# Patient Record
Sex: MALE | Race: WHITE | HISPANIC OR LATINO | Employment: FULL TIME | URBAN - METROPOLITAN AREA
[De-identification: names, ages, dates, MRNs, and addresses within clinical notes are randomized per-mention and may not be internally consistent; named-entity substitution may affect disease eponyms.]

---

## 2017-09-03 ENCOUNTER — HOSPITAL ENCOUNTER (EMERGENCY)
Facility: HOSPITAL | Age: 52
Discharge: HOME | End: 2017-09-03
Attending: EMERGENCY MEDICINE | Admitting: EMERGENCY MEDICINE
Payer: COMMERCIAL

## 2017-09-03 VITALS
HEART RATE: 64 BPM | OXYGEN SATURATION: 97 % | DIASTOLIC BLOOD PRESSURE: 57 MMHG | RESPIRATION RATE: 18 BRPM | SYSTOLIC BLOOD PRESSURE: 106 MMHG | TEMPERATURE: 97.7 F | WEIGHT: 168 LBS

## 2017-09-03 DIAGNOSIS — R22.2 CHEST WALL MASS: Primary | ICD-10-CM

## 2017-09-03 DIAGNOSIS — L72.3 SEBACEOUS CYST: ICD-10-CM

## 2017-09-03 PROCEDURE — 99283 EMERGENCY DEPT VISIT LOW MDM: CPT

## 2017-09-12 ENCOUNTER — ALLSCRIPTS OFFICE VISIT (OUTPATIENT)
Dept: OTHER | Facility: OTHER | Age: 52
End: 2017-09-12

## 2017-09-12 PROCEDURE — 88304 TISSUE EXAM BY PATHOLOGIST: CPT | Performed by: SURGERY

## 2017-09-13 ENCOUNTER — LAB REQUISITION (OUTPATIENT)
Dept: LAB | Facility: HOSPITAL | Age: 52
End: 2017-09-13
Payer: COMMERCIAL

## 2017-09-13 DIAGNOSIS — L72.3 SEBACEOUS CYST: ICD-10-CM

## 2017-09-17 ENCOUNTER — HOSPITAL ENCOUNTER (EMERGENCY)
Facility: HOSPITAL | Age: 52
Discharge: HOME | End: 2017-09-17
Attending: EMERGENCY MEDICINE | Admitting: EMERGENCY MEDICINE
Payer: COMMERCIAL

## 2017-09-17 VITALS
DIASTOLIC BLOOD PRESSURE: 98 MMHG | RESPIRATION RATE: 18 BRPM | BODY MASS INDEX: 30.22 KG/M2 | TEMPERATURE: 97 F | HEART RATE: 84 BPM | WEIGHT: 177 LBS | OXYGEN SATURATION: 98 % | HEIGHT: 64 IN | SYSTOLIC BLOOD PRESSURE: 146 MMHG

## 2017-09-17 DIAGNOSIS — Z51.89 VISIT FOR WOUND CHECK: Primary | ICD-10-CM

## 2017-09-17 PROCEDURE — 96372 THER/PROPH/DIAG INJ SC/IM: CPT

## 2017-09-17 PROCEDURE — 99283 EMERGENCY DEPT VISIT LOW MDM: CPT

## 2017-09-17 RX ORDER — KETOROLAC TROMETHAMINE 30 MG/ML
15 INJECTION, SOLUTION INTRAMUSCULAR; INTRAVENOUS ONCE
Status: COMPLETED | OUTPATIENT
Start: 2017-09-17 | End: 2017-09-17

## 2017-09-17 RX ADMIN — KETOROLAC TROMETHAMINE 15 MG: 30 INJECTION, SOLUTION INTRAMUSCULAR at 13:55

## 2017-09-19 ENCOUNTER — GENERIC CONVERSION - ENCOUNTER (OUTPATIENT)
Dept: OTHER | Facility: OTHER | Age: 52
End: 2017-09-19

## 2017-09-26 ENCOUNTER — ALLSCRIPTS OFFICE VISIT (OUTPATIENT)
Dept: OTHER | Facility: OTHER | Age: 52
End: 2017-09-26

## 2018-01-13 VITALS
WEIGHT: 167 LBS | SYSTOLIC BLOOD PRESSURE: 90 MMHG | HEIGHT: 66 IN | BODY MASS INDEX: 26.84 KG/M2 | DIASTOLIC BLOOD PRESSURE: 58 MMHG | TEMPERATURE: 97.3 F

## 2018-01-13 VITALS
HEIGHT: 66 IN | DIASTOLIC BLOOD PRESSURE: 58 MMHG | SYSTOLIC BLOOD PRESSURE: 92 MMHG | BODY MASS INDEX: 27.52 KG/M2 | WEIGHT: 171.25 LBS

## 2018-01-13 NOTE — MISCELLANEOUS
Message  Return to work or school:   Domenico Avendaño is under my professional care  He was seen in my office on 9/26/17  He is not able to return to work until 9/27/17             Signatures   Electronically signed by : Diego Beltran MD; Sep 25 2017  1:26PM EST                       (Author)    Electronically signed by : Diego Beltran MD; Sep 27 2017 10:08AM EST                       (Author)

## 2018-01-17 NOTE — RESULT NOTES
Verified Results  (1) TISSUE EXAM [id number] 12:08PM Tyesha Basket     Test Name Result Flag Reference   LAB AP CASE REPORT (Report)     Surgical Pathology Report             Case: [id number]                   Authorizing Provider: Shirlee Osgood, MD    Collected:      09/12/2017 1208        Pathologist:      Rito Ovalle MD      Received:      09/13/2017 0623        Specimen:  Skin, Cyst/Tag/Debridement, Chest Wall   LAB AP FINAL DIAGNOSIS      A  Skin, Cyst/Tag/Debridement, Chest Wall, excision:  - Epidermal (infundibular) cyst       Interpretation performed at [place] [id number]  Electronically signed by Rito Ovalle MD on 9/18/2017 at 9:05 PM   LAB AP SURGICAL ADDITIONAL INFORMATION (Report)     All controls performed with the immunohistochemical stains reported above   reacted appropriately  These tests were developed and their performance   characteristics determined by [facility] or   [facility]  They may not be cleared or approved by the [place]  Food and Drug Administration  The FDA has determined that such clearance   or approval is not necessary  These tests are used for clinical purposes  They should not be regarded as investigational or for research  This   laboratory has been approved by CLIA 88, designated as a high-complexity   laboratory and is qualified to perform these tests  LAB AP GROSS DESCRIPTION (Report)     A  The specimen is received in formalin, labeled with the patient's name   and hospital number, and is designated chest wall  The specimen consists   of multiple tan yellow soft tissue and caseous tissue fragments consistent   with disrupted cystic structure and contents measuring 3 0 x 2 0 x 0 9 cm  The specimen is serially sectioned and representative sections are   submitted in one cassette      Note: The estimated total formalin fixation time based upon information   provided by the submitting clinician and the standard processing schedule   is less than 72 hours   [facility]   LAB AP CLINICAL INFORMATION Sebaceous cyst

## 2018-01-17 NOTE — MISCELLANEOUS
Message  Return to work or school:   Mila Mcelroy is under my professional care  He was seen in my office on 9/26/17   He is able to return to work on  9/27/17              Signatures   Electronically signed by : Jaci Fox MD; Sep 27 2017 10:08AM EST                       (Author)